# Patient Record
Sex: MALE | Race: WHITE | Employment: FULL TIME
[De-identification: names, ages, dates, MRNs, and addresses within clinical notes are randomized per-mention and may not be internally consistent; named-entity substitution may affect disease eponyms.]

---

## 2022-09-21 ENCOUNTER — OFFICE VISIT (OUTPATIENT)
Dept: INTERNAL MEDICINE CLINIC | Facility: CLINIC | Age: 47
End: 2022-09-21
Payer: COMMERCIAL

## 2022-09-21 VITALS
WEIGHT: 181.8 LBS | TEMPERATURE: 98.5 F | DIASTOLIC BLOOD PRESSURE: 78 MMHG | BODY MASS INDEX: 27.55 KG/M2 | SYSTOLIC BLOOD PRESSURE: 112 MMHG | HEART RATE: 81 BPM | OXYGEN SATURATION: 98 % | HEIGHT: 68 IN

## 2022-09-21 DIAGNOSIS — Z12.5 PROSTATE CANCER SCREENING: ICD-10-CM

## 2022-09-21 DIAGNOSIS — E66.3 OVERWEIGHT (BMI 25.0-29.9): ICD-10-CM

## 2022-09-21 DIAGNOSIS — Z23 ENCOUNTER FOR IMMUNIZATION: ICD-10-CM

## 2022-09-21 DIAGNOSIS — Z00.00 ENCOUNTER FOR PREVENTIVE HEALTH EXAMINATION: Primary | ICD-10-CM

## 2022-09-21 DIAGNOSIS — E78.00 HYPERCHOLESTEROLEMIA: ICD-10-CM

## 2022-09-21 DIAGNOSIS — Z12.11 COLON CANCER SCREENING: ICD-10-CM

## 2022-09-21 PROCEDURE — 99386 PREV VISIT NEW AGE 40-64: CPT | Performed by: INTERNAL MEDICINE

## 2022-09-21 PROCEDURE — 90715 TDAP VACCINE 7 YRS/> IM: CPT | Performed by: INTERNAL MEDICINE

## 2022-09-21 PROCEDURE — 90471 IMMUNIZATION ADMIN: CPT | Performed by: INTERNAL MEDICINE

## 2022-09-21 ASSESSMENT — PATIENT HEALTH QUESTIONNAIRE - PHQ9
SUM OF ALL RESPONSES TO PHQ QUESTIONS 1-9: 0
2. FEELING DOWN, DEPRESSED OR HOPELESS: 0
1. LITTLE INTEREST OR PLEASURE IN DOING THINGS: 0
SUM OF ALL RESPONSES TO PHQ QUESTIONS 1-9: 0
SUM OF ALL RESPONSES TO PHQ9 QUESTIONS 1 & 2: 0

## 2022-09-21 ASSESSMENT — ENCOUNTER SYMPTOMS
CHOKING: 0
STRIDOR: 0
VOICE CHANGE: 0
EYE PAIN: 0
RECTAL PAIN: 0

## 2022-09-21 NOTE — PROGRESS NOTES
NEW PATIENT VISIT / ANNUAL PREVENTIVE     Subjective:    Mr. Robert Phillips is a 50 y.o., male,   Chief Complaint   Patient presents with    New Patient       HPI:    Mr. Robert Phillips is a 50 y.o., male who presents today for a new patient appointment. The patient has not had a PCP in the past.  He would like a referral for colon cancer screening, baseline screening labs, updated vaccinations, and a general physical given that he will be age 46 in several months. He is a [employer] graduate and currently the  at [employer] in [place]. He does not drink or use tobacco products. He has been generally active and works out several days a week. Due to a mild case of plantar fasciitis last year he stopped running and has gained some weight (baseline weight 165). He plans to start exercising again and lose weight. He had an elevated cholesterol when last checked in his 25s or 30s. No family history of premature heart disease but both parents are on statin therapy in their 76s.       The following portions of the patient's history were reviewed and updated as appropriate:      Past Medical History:   Diagnosis Date    Hypercholesterolemia        Past Surgical History:   Procedure Laterality Date    REFRACTIVE SURGERY      WISDOM TOOTH EXTRACTION      WRIST SURGERY Right     for DeQuervain's tenosynovitis       Family History   Problem Relation Age of Onset    High Cholesterol Mother     High Cholesterol Father        Social History     Socioeconomic History    Marital status:      Spouse name: Earnest Smith    Number of children: 3    Years of education: Not on file    Highest education level: Not on file   Occupational History     Comment:    Tobacco Use    Smoking status: Never    Smokeless tobacco: Never   Substance and Sexual Activity    Alcohol use: Not Currently    Drug use: Never    Sexual activity: Not on file   Other Topics Concern    Not on file   Social History Narrative    Not on file     Social Determinants of Health     Financial Resource Strain: Not on file   Food Insecurity: Not on file   Transportation Needs: Not on file   Physical Activity: Not on file   Stress: Not on file   Social Connections: Not on file   Intimate Partner Violence: Not on file   Housing Stability: Not on file       No current outpatient medications on file. No current facility-administered medications for this visit. Allergies as of 09/21/2022    (No Known Allergies)       Review of Systems   Constitutional:  Negative for activity change and appetite change. HENT:  Negative for drooling and voice change. Eyes:  Negative for pain. Respiratory:  Negative for choking and stridor. Gastrointestinal:  Negative for rectal pain. Endocrine: Negative for polydipsia and polyphagia. Genitourinary:  Negative for enuresis and penile pain. Musculoskeletal:  Negative for gait problem and neck stiffness. Skin:  Negative for pallor. Allergic/Immunologic: Negative for immunocompromised state. Neurological:  Negative for facial asymmetry and speech difficulty. Hematological:  Does not bruise/bleed easily. Psychiatric/Behavioral:  Negative for self-injury. The patient is not hyperactive. Patient Care Team:  Yessica Che MD as PCP - General (Internal Medicine)    Objective:    /78 (Site: Left Upper Arm, Position: Sitting)   Pulse 81   Temp 98.5 °F (36.9 °C) (Temporal)   Ht 5' 8\" (1.727 m)   Wt 181 lb 12.8 oz (82.5 kg)   SpO2 98%   BMI 27.64 kg/m²     Physical Exam  Vitals reviewed. Constitutional:       General: He is not in acute distress. Appearance: Normal appearance. He is not toxic-appearing. HENT:      Head: Normocephalic and atraumatic.       Right Ear: Tympanic membrane, ear canal and external ear normal.      Left Ear: Tympanic membrane, ear canal and external ear normal.      Nose: Nose normal.      Mouth/Throat:      Mouth: Mucous membranes are moist.      Pharynx: Oropharynx is clear.   Eyes:      General: No scleral icterus. Extraocular Movements: Extraocular movements intact. Conjunctiva/sclera: Conjunctivae normal.      Pupils: Pupils are equal, round, and reactive to light. Cardiovascular:      Rate and Rhythm: Normal rate and regular rhythm. Pulses: Normal pulses. Heart sounds: Normal heart sounds. Pulmonary:      Breath sounds: Normal breath sounds. Abdominal:      General: Abdomen is flat. Bowel sounds are normal.      Palpations: Abdomen is soft. There is no mass. Tenderness: There is no guarding or rebound. Musculoskeletal:         General: Normal range of motion. Cervical back: Normal range of motion and neck supple. Skin:     General: Skin is warm and dry. Coloration: Skin is not jaundiced. Neurological:      Mental Status: He is alert and oriented to person, place, and time. Mental status is at baseline. Psychiatric:         Behavior: Behavior normal.         Thought Content: Thought content normal.            No results found for any previous visit. Assessent & Plan:        1. Encounter for preventive health examination  Overview:  9/21/22 The patient was seen today for the annual preventive health visit. The patient was provided anticipatory guidance and counseling regarding age / sex appropriate health maintenance issues including vaccinations, blood pressure screening, lipid screening, cancer screenings, diet, exercise, avoidance of tobacco / substance abuse. Orders:  -     CBC with Auto Differential; Future  -     Comprehensive Metabolic Panel; Future  2. Hypercholesterolemia  Overview:  Gave handout regarding low cholesterol diet. Update labs before next visit. Orders:  -     CBC with Auto Differential; Future  -     Comprehensive Metabolic Panel; Future  -     Lipid Panel; Future  3. Prostate cancer screening  Overview:  Check PSA with next routine labs. Orders:  -     PSA Screening; Future  4.  Encounter for immunization  Overview:  Vaccinations were reviewed and discussed. Tdap today. Flu and Covid booster recommended. Orders:  -     Tdap, ADACEL, (age 10y-63y), IM  5. Colon cancer screening  Overview:  The patient and I discussed colon cancer screening rationale and modalities including colonoscopy and non-invasive tests such as Cologuard. Discussed the limitations / false positives / false negatives. He is willing to do colonoscopy. Referral placed to [facility]. Orders:  -     [facility]- Colonoscopy  6. Overweight (BMI 25.0-29. 9)  Overview:  Reviewed the patient's BMI. Discussed the need to lose weight in order to avoid many preventable illnesses. Discussed diet, exercise, and weight loss strategies. Now that his plantar fasciitis has improved he plans to resume running. The patient and/or patient representative voiced understanding and agreement with the current diagnoses, recommendations, and possible side effects. Return in about 3 months (around 12/21/2022) for follow up of chronic medical problems, review labs.

## 2022-10-21 PROBLEM — Z12.5 PROSTATE CANCER SCREENING: Status: RESOLVED | Noted: 2022-09-21 | Resolved: 2022-10-21

## 2022-10-21 PROBLEM — Z00.00 ENCOUNTER FOR PREVENTIVE HEALTH EXAMINATION: Status: RESOLVED | Noted: 2022-09-21 | Resolved: 2022-10-21

## 2022-10-21 PROBLEM — Z12.11 COLON CANCER SCREENING: Status: RESOLVED | Noted: 2022-09-21 | Resolved: 2022-10-21

## 2022-12-14 DIAGNOSIS — Z12.5 PROSTATE CANCER SCREENING: ICD-10-CM

## 2022-12-14 DIAGNOSIS — Z00.00 ENCOUNTER FOR PREVENTIVE HEALTH EXAMINATION: ICD-10-CM

## 2022-12-14 DIAGNOSIS — E78.00 HYPERCHOLESTEROLEMIA: ICD-10-CM

## 2022-12-14 LAB
ALBUMIN SERPL-MCNC: 4 G/DL (ref 3.5–5)
ALBUMIN/GLOB SERPL: 1.5 (ref 0.4–1.6)
ALP SERPL-CCNC: 115 U/L (ref 50–136)
ALT SERPL-CCNC: 27 U/L (ref 12–65)
ANION GAP SERPL CALC-SCNC: 5 MMOL/L (ref 2–11)
AST SERPL-CCNC: 17 U/L (ref 15–37)
BASOPHILS # BLD: 0.1 K/UL (ref 0–0.2)
BASOPHILS NFR BLD: 1 % (ref 0–2)
BILIRUB SERPL-MCNC: 1.4 MG/DL (ref 0.2–1.1)
BUN SERPL-MCNC: 17 MG/DL (ref 6–23)
CALCIUM SERPL-MCNC: 9.5 MG/DL (ref 8.3–10.4)
CHLORIDE SERPL-SCNC: 105 MMOL/L (ref 101–110)
CHOLEST SERPL-MCNC: 212 MG/DL
CO2 SERPL-SCNC: 29 MMOL/L (ref 21–32)
CREAT SERPL-MCNC: 1.5 MG/DL (ref 0.8–1.5)
DIFFERENTIAL METHOD BLD: NORMAL
EOSINOPHIL # BLD: 0.3 K/UL (ref 0–0.8)
EOSINOPHIL NFR BLD: 6 % (ref 0.5–7.8)
ERYTHROCYTE [DISTWIDTH] IN BLOOD BY AUTOMATED COUNT: 13 % (ref 11.9–14.6)
GLOBULIN SER CALC-MCNC: 2.7 G/DL (ref 2.8–4.5)
GLUCOSE SERPL-MCNC: 91 MG/DL (ref 65–100)
HCT VFR BLD AUTO: 48 % (ref 41.1–50.3)
HDLC SERPL-MCNC: 54 MG/DL (ref 40–60)
HDLC SERPL: 3.9
HGB BLD-MCNC: 15.2 G/DL (ref 13.6–17.2)
IMM GRANULOCYTES # BLD AUTO: 0 K/UL (ref 0–0.5)
IMM GRANULOCYTES NFR BLD AUTO: 0 % (ref 0–5)
LDLC SERPL CALC-MCNC: 146.6 MG/DL
LYMPHOCYTES # BLD: 1.5 K/UL (ref 0.5–4.6)
LYMPHOCYTES NFR BLD: 32 % (ref 13–44)
MCH RBC QN AUTO: 27.9 PG (ref 26.1–32.9)
MCHC RBC AUTO-ENTMCNC: 31.7 G/DL (ref 31.4–35)
MCV RBC AUTO: 88.2 FL (ref 82–102)
MONOCYTES # BLD: 0.4 K/UL (ref 0.1–1.3)
MONOCYTES NFR BLD: 8 % (ref 4–12)
NEUTS SEG # BLD: 2.5 K/UL (ref 1.7–8.2)
NEUTS SEG NFR BLD: 53 % (ref 43–78)
NRBC # BLD: 0 K/UL (ref 0–0.2)
PLATELET # BLD AUTO: 261 K/UL (ref 150–450)
PMV BLD AUTO: 10.3 FL (ref 9.4–12.3)
POTASSIUM SERPL-SCNC: 4.1 MMOL/L (ref 3.5–5.1)
PROT SERPL-MCNC: 6.7 G/DL (ref 6.3–8.2)
PSA SERPL-MCNC: 1.5 NG/ML
RBC # BLD AUTO: 5.44 M/UL (ref 4.23–5.6)
SODIUM SERPL-SCNC: 139 MMOL/L (ref 133–143)
TRIGL SERPL-MCNC: 57 MG/DL (ref 35–150)
VLDLC SERPL CALC-MCNC: 11.4 MG/DL (ref 6–23)
WBC # BLD AUTO: 4.7 K/UL (ref 4.3–11.1)

## 2022-12-21 ENCOUNTER — OFFICE VISIT (OUTPATIENT)
Dept: INTERNAL MEDICINE CLINIC | Facility: CLINIC | Age: 47
End: 2022-12-21
Payer: COMMERCIAL

## 2022-12-21 VITALS
BODY MASS INDEX: 25.43 KG/M2 | WEIGHT: 167.8 LBS | DIASTOLIC BLOOD PRESSURE: 62 MMHG | OXYGEN SATURATION: 98 % | SYSTOLIC BLOOD PRESSURE: 98 MMHG | HEART RATE: 75 BPM | HEIGHT: 68 IN

## 2022-12-21 DIAGNOSIS — Z12.11 COLON CANCER SCREENING: ICD-10-CM

## 2022-12-21 DIAGNOSIS — Z12.5 PROSTATE CANCER SCREENING: ICD-10-CM

## 2022-12-21 DIAGNOSIS — N18.31 CHRONIC KIDNEY DISEASE, STAGE 3A (HCC): ICD-10-CM

## 2022-12-21 DIAGNOSIS — E66.3 OVERWEIGHT (BMI 25.0-29.9): ICD-10-CM

## 2022-12-21 DIAGNOSIS — E78.00 HYPERCHOLESTEROLEMIA: ICD-10-CM

## 2022-12-21 DIAGNOSIS — Z23 ENCOUNTER FOR IMMUNIZATION: ICD-10-CM

## 2022-12-21 PROCEDURE — 99214 OFFICE O/P EST MOD 30 MIN: CPT | Performed by: INTERNAL MEDICINE

## 2022-12-21 ASSESSMENT — ENCOUNTER SYMPTOMS
VOICE CHANGE: 0
CHOKING: 0
STRIDOR: 0
RECTAL PAIN: 0
EYE PAIN: 0

## 2022-12-21 NOTE — PROGRESS NOTES
FOLLOWUP VISIT    Subjective:    Mr. Anderson Campa is a 50 y.o., male,   Chief Complaint   Patient presents with    3 Month Follow-Up       HPI:    Patient presents today for follow up of two or more chronic medical problems and review of labs. The patient has hyperlipidemia. The patient has been following a low cholesterol diet. He is back to running and has lost 14 lbs last several months. No other complaints. The following portions of the patient's history were reviewed and updated as appropriate:      Past Medical History:   Diagnosis Date    Hypercholesterolemia        Past Surgical History:   Procedure Laterality Date    REFRACTIVE SURGERY      WISDOM TOOTH EXTRACTION      WRIST SURGERY Right     for DeQuervain's tenosynovitis       Family History   Problem Relation Age of Onset    High Cholesterol Mother     High Cholesterol Father        Social History     Socioeconomic History    Marital status:      Spouse name: Opal Cervantes    Number of children: 3    Years of education: Not on file    Highest education level: Not on file   Occupational History     Comment:    Tobacco Use    Smoking status: Never    Smokeless tobacco: Never   Substance and Sexual Activity    Alcohol use: Not Currently    Drug use: Never    Sexual activity: Not on file   Other Topics Concern    Not on file   Social History Narrative    Not on file     Social Determinants of Health     Financial Resource Strain: Not on file   Food Insecurity: Not on file   Transportation Needs: Not on file   Physical Activity: Not on file   Stress: Not on file   Social Connections: Not on file   Intimate Partner Violence: Not on file   Housing Stability: Not on file       No current outpatient medications on file. No current facility-administered medications for this visit. Allergies as of 12/21/2022    (No Known Allergies)       Review of Systems   Constitutional:  Negative for activity change and appetite change.    HENT: Negative for drooling and voice change. Eyes:  Negative for pain. Respiratory:  Negative for choking and stridor. Gastrointestinal:  Negative for rectal pain. Endocrine: Negative for polydipsia and polyphagia. Genitourinary:  Negative for enuresis and penile pain. Musculoskeletal:  Negative for gait problem and neck stiffness. Skin:  Negative for pallor. Allergic/Immunologic: Negative for immunocompromised state. Neurological:  Negative for facial asymmetry and speech difficulty. Hematological:  Does not bruise/bleed easily. Psychiatric/Behavioral:  Negative for self-injury. The patient is not hyperactive. Patient Care Team:  Cece Laureano MD as PCP - General (Internal Medicine)  Cece Laureano MD as PCP - [facility] Provider    Objective:    BP 98/62 (Site: Right Upper Arm, Position: Sitting)   Pulse 75   Ht 5' 8\" (1.727 m)   Wt 167 lb 12.8 oz (76.1 kg)   SpO2 98%   BMI 25.51 kg/m²     Physical Exam  Vitals reviewed. Constitutional:       General: He is not in acute distress. Appearance: Normal appearance. He is not toxic-appearing. HENT:      Head: Normocephalic and atraumatic. Right Ear: Tympanic membrane, ear canal and external ear normal.      Left Ear: Tympanic membrane, ear canal and external ear normal.      Nose: Nose normal.      Mouth/Throat:      Mouth: Mucous membranes are moist.      Pharynx: Oropharynx is clear. Eyes:      General: No scleral icterus. Extraocular Movements: Extraocular movements intact. Conjunctiva/sclera: Conjunctivae normal.      Pupils: Pupils are equal, round, and reactive to light. Cardiovascular:      Rate and Rhythm: Normal rate and regular rhythm. Pulses: Normal pulses. Heart sounds: Normal heart sounds. Pulmonary:      Breath sounds: Normal breath sounds. Abdominal:      General: Abdomen is flat. Bowel sounds are normal.      Palpations: Abdomen is soft. There is no mass. Tenderness:  There is no guarding or rebound. Musculoskeletal:         General: Normal range of motion. Cervical back: Normal range of motion and neck supple. Skin:     General: Skin is warm and dry. Coloration: Skin is not jaundiced. Neurological:      Mental Status: He is alert and oriented to person, place, and time. Mental status is at baseline. Psychiatric:         Behavior: Behavior normal.         Thought Content: Thought content normal.            Orders Only on 12/14/2022   Component Date Value Ref Range Status    PSA 12/14/2022 1.5  <4.0 ng/mL Final    Comment: [employer]. New method in use, please reestablish patient baseline  [employer] [place] LOCI technology. Patient's results of tumor marker testing may not be comparable to labs using different manufacturers/methods.       Cholesterol, Total 12/14/2022 212 (A)  <200 MG/DL Final    Comment: Borderline High: 200-239 mg/dL  High: Greater than or equal to 240 mg/dL      Triglycerides 12/14/2022 57  35 - 150 MG/DL Final    Comment: Borderline High: 150-199 mg/dL, High: 200-499 mg/dL  Very High: Greater than or equal to 500 mg/dL      HDL 12/14/2022 54  40 - 60 MG/DL Final    LDL Calculated 12/14/2022 146.6 (A)  <100 MG/DL Final    Comment: Near Optimal: 100-129 mg/dL  Borderline High: 130-159, High: 160-189 mg/dL  Very High: Greater than or equal to 190 mg/dL      VLDL Cholesterol Calculated 12/14/2022 11.4  6.0 - 23.0 MG/DL Final    Chol/HDL Ratio 12/14/2022 3.9    Final    Sodium 12/14/2022 139  133 - 143 mmol/L Final    Potassium 12/14/2022 4.1  3.5 - 5.1 mmol/L Final    Chloride 12/14/2022 105  101 - 110 mmol/L Final    CO2 12/14/2022 29  21 - 32 mmol/L Final    Anion Gap 12/14/2022 5  2 - 11 mmol/L Final    Glucose 12/14/2022 91  65 - 100 mg/dL Final    BUN 12/14/2022 17  6 - 23 MG/DL Final    Creatinine 12/14/2022 1.50  0.8 - 1.5 MG/DL Final    Est, Glom Filt Rate 12/14/2022 57 (A)  >60 ml/min/1.73m2 Final    Comment:   Pediatric calculator link: [employer]. org/professionals/kdoqi/gfr_calculatorped    These results are not intended for use in patients <25years of age. eGFR results are calculated without a race factor using  the 2021 CKD-EPI equation. Careful clinical correlation is recommended, particularly when comparing to results calculated using previous equations. The CKD-EPI equation is less accurate in patients with extremes of muscle mass, extra-renal metabolism of creatinine, excessive creatine ingestion, or following therapy that affects renal tubular secretion.       Calcium 12/14/2022 9.5  8.3 - 10.4 MG/DL Final    Total Bilirubin 12/14/2022 1.4 (A)  0.2 - 1.1 MG/DL Final    ALT 12/14/2022 27  12 - 65 U/L Final    AST 12/14/2022 17  15 - 37 U/L Final    Alk Phosphatase 12/14/2022 115  50 - 136 U/L Final    Total Protein 12/14/2022 6.7  6.3 - 8.2 g/dL Final    Albumin 12/14/2022 4.0  3.5 - 5.0 g/dL Final    Globulin 12/14/2022 2.7 (A)  2.8 - 4.5 g/dL Final    Albumin/Globulin Ratio 12/14/2022 1.5  0.4 - 1.6   Final    WBC 12/14/2022 4.7  4.3 - 11.1 K/uL Final    RBC 12/14/2022 5.44  4.23 - 5.6 M/uL Final    Hemoglobin 12/14/2022 15.2  13.6 - 17.2 g/dL Final    Hematocrit 12/14/2022 48.0  41.1 - 50.3 % Final    MCV 12/14/2022 88.2  82 - 102 FL Final    MCH 12/14/2022 27.9  26.1 - 32.9 PG Final    MCHC 12/14/2022 31.7  31.4 - 35.0 g/dL Final    RDW 12/14/2022 13.0  11.9 - 14.6 % Final    Platelets 81/27/2931 261  150 - 450 K/uL Final    MPV 12/14/2022 10.3  9.4 - 12.3 FL Final    nRBC 12/14/2022 0.00  0.0 - 0.2 K/uL Final    **Note: Absolute NRBC parameter is now reported with Hemogram**    Differential Type 12/14/2022 AUTOMATED    Final    Seg Neutrophils 12/14/2022 53  43 - 78 % Final    Lymphocytes 12/14/2022 32  13 - 44 % Final    Monocytes 12/14/2022 8  4.0 - 12.0 % Final    Eosinophils % 12/14/2022 6  0.5 - 7.8 % Final    Basophils 12/14/2022 1  0.0 - 2.0 % Final    Immature Granulocytes 12/14/2022 0  0.0 - 5.0 % Final Segs Absolute 12/14/2022 2.5  1.7 - 8.2 K/UL Final    Absolute Lymph # 12/14/2022 1.5  0.5 - 4.6 K/UL Final    Absolute Mono # 12/14/2022 0.4  0.1 - 1.3 K/UL Final    Absolute Eos # 12/14/2022 0.3  0.0 - 0.8 K/UL Final    Basophils Absolute 12/14/2022 0.1  0.0 - 0.2 K/UL Final    Absolute Immature Granulocyte 12/14/2022 0.0  0.0 - 0.5 K/UL Final         Assessent & Plan:        1. Hypercholesterolemia  Overview:  12/14/22 total 212 HDL 54  TG 57 on low cholesterol diet. 10 year ACC / AHA risk = 2.5%. Labs were reviewed and discussed with patient. Ten yr CV risk < 5%. Continue low cholesterol diet. 2. Encounter for immunization  Overview:  Vaccinations were reviewed and discussed. Flu and Covid booster recommended. 3. Overweight (BMI 25.0-29. 9)  Overview:  Reviewed the patient's BMI. Discussed the need to lose weight in order to avoid many preventable illnesses. Discussed diet, exercise, and weight loss strategies. 4. Prostate cancer screening  Overview:  12/14/22 PSA 1.5  5. Colon cancer screening  Overview:  11/18/22 colonoscopy (Dr. Marilynn Kan) - one 2 mm polyp removed. Repeat interval depending upon pathology. Will call GI for pathology report. 6. Chronic kidney disease, stage 3a ([facility] [address]  Overview:  12/14/22 creatinine 1.50, eGFR 57.      Labs were reviewed and discussed with patient. There are no old labs to compare. The patient was advised to avoid NSAIDs and other nephrotoxins. Will dose adjust medications as appropriate. Will monitor labs over time. Repeat creatinine and urinalysis in 6 months. Patient states that he has been eating a high protein diet and exercising vigorously and probably not drinking enough water last several weeks in an effort to lose weight (lost 14 lbs). Orders:  -     Basic Metabolic Panel; Future  -     Urinalysis;  Future        The patient and/or patient representative voiced understanding and agreement with the current diagnoses, recommendations, and possible side effects. Return in about 6 months (around 6/21/2023) for follow up of chronic medical problems, review labs.

## 2023-01-20 PROBLEM — Z12.11 COLON CANCER SCREENING: Status: RESOLVED | Noted: 2022-09-21 | Resolved: 2023-01-20

## 2023-01-20 PROBLEM — Z12.5 PROSTATE CANCER SCREENING: Status: RESOLVED | Noted: 2022-09-21 | Resolved: 2023-01-20

## 2023-05-22 ENCOUNTER — OFFICE VISIT (OUTPATIENT)
Dept: INTERNAL MEDICINE CLINIC | Facility: CLINIC | Age: 48
End: 2023-05-22
Payer: COMMERCIAL

## 2023-05-22 VITALS
WEIGHT: 172.6 LBS | BODY MASS INDEX: 26.16 KG/M2 | HEIGHT: 68 IN | HEART RATE: 59 BPM | SYSTOLIC BLOOD PRESSURE: 118 MMHG | DIASTOLIC BLOOD PRESSURE: 70 MMHG

## 2023-05-22 DIAGNOSIS — L23.7 ALLERGIC CONTACT DERMATITIS DUE TO PLANTS, EXCEPT FOOD: ICD-10-CM

## 2023-05-22 DIAGNOSIS — N18.31 CHRONIC KIDNEY DISEASE, STAGE 3A (HCC): ICD-10-CM

## 2023-05-22 PROCEDURE — 99213 OFFICE O/P EST LOW 20 MIN: CPT | Performed by: INTERNAL MEDICINE

## 2023-05-22 RX ORDER — DESOXIMETASONE 2.5 MG/G
CREAM TOPICAL
Qty: 60 G | Refills: 0 | Status: SHIPPED | OUTPATIENT
Start: 2023-05-22

## 2023-05-22 SDOH — ECONOMIC STABILITY: INCOME INSECURITY: HOW HARD IS IT FOR YOU TO PAY FOR THE VERY BASICS LIKE FOOD, HOUSING, MEDICAL CARE, AND HEATING?: NOT HARD AT ALL

## 2023-05-22 SDOH — ECONOMIC STABILITY: HOUSING INSECURITY
IN THE LAST 12 MONTHS, WAS THERE A TIME WHEN YOU DID NOT HAVE A STEADY PLACE TO SLEEP OR SLEPT IN A SHELTER (INCLUDING NOW)?: NO

## 2023-05-22 SDOH — ECONOMIC STABILITY: FOOD INSECURITY: WITHIN THE PAST 12 MONTHS, THE FOOD YOU BOUGHT JUST DIDN'T LAST AND YOU DIDN'T HAVE MONEY TO GET MORE.: NEVER TRUE

## 2023-05-22 SDOH — ECONOMIC STABILITY: FOOD INSECURITY: WITHIN THE PAST 12 MONTHS, YOU WORRIED THAT YOUR FOOD WOULD RUN OUT BEFORE YOU GOT MONEY TO BUY MORE.: NEVER TRUE

## 2023-05-22 ASSESSMENT — PATIENT HEALTH QUESTIONNAIRE - PHQ9
1. LITTLE INTEREST OR PLEASURE IN DOING THINGS: 0
SUM OF ALL RESPONSES TO PHQ QUESTIONS 1-9: 0
SUM OF ALL RESPONSES TO PHQ QUESTIONS 1-9: 0
2. FEELING DOWN, DEPRESSED OR HOPELESS: 0
SUM OF ALL RESPONSES TO PHQ QUESTIONS 1-9: 0
SUM OF ALL RESPONSES TO PHQ9 QUESTIONS 1 & 2: 0
SUM OF ALL RESPONSES TO PHQ QUESTIONS 1-9: 0

## 2023-05-22 ASSESSMENT — ENCOUNTER SYMPTOMS
RECTAL PAIN: 0
STRIDOR: 0
EYE PAIN: 0
VOICE CHANGE: 0
CHOKING: 0

## 2023-05-22 NOTE — PROGRESS NOTES
Allergies)       Review of Systems   Constitutional:  Negative for activity change and appetite change. HENT:  Negative for drooling and voice change. Eyes:  Negative for pain. Respiratory:  Negative for choking and stridor. Gastrointestinal:  Negative for rectal pain. Endocrine: Negative for polydipsia and polyphagia. Genitourinary:  Negative for enuresis and penile pain. Musculoskeletal:  Negative for gait problem and neck stiffness. Skin:  Negative for pallor. Allergic/Immunologic: Negative for immunocompromised state. Neurological:  Negative for facial asymmetry and speech difficulty. Hematological:  Does not bruise/bleed easily. Psychiatric/Behavioral:  Negative for self-injury. The patient is not hyperactive. Patient Care Team:  Claudene Meadows, MD as PCP - General (Internal Medicine)  Claudene Meadows, MD as PCP - Empaneled Provider    Objective:    /70 (Site: Left Upper Arm)   Pulse 59   Ht 5' 8\" (1.727 m)   Wt 172 lb 9.6 oz (78.3 kg)   BMI 26.24 kg/m²     Physical Exam  Constitutional:       General: He is not in acute distress. Appearance: He is not toxic-appearing. HENT:      Head: Normocephalic and atraumatic. Nose: Nose normal.   Eyes:      Extraocular Movements: Extraocular movements intact. Conjunctiva/sclera: Conjunctivae normal.   Pulmonary:      Effort: Pulmonary effort is normal. No respiratory distress. Breath sounds: No stridor. Skin:     Coloration: Skin is not jaundiced or pale. Comments: Mild nonspecific appearing dermatitis on both upper arms and forearms with geographic borders. Neurological:      Mental Status: He is alert and oriented to person, place, and time. Psychiatric:         Thought Content: Thought content normal.            Orders Only on 12/14/2022   Component Date Value Ref Range Status    PSA 12/14/2022 1.5  <4.0 ng/mL Final    Comment: [employer].   New method in use, please reestablish patient

## 2023-06-08 DIAGNOSIS — N18.31 CHRONIC KIDNEY DISEASE, STAGE 3A (HCC): ICD-10-CM

## 2023-06-08 LAB
ANION GAP SERPL CALC-SCNC: 4 MMOL/L (ref 2–11)
APPEARANCE UR: CLEAR
BILIRUB UR QL: NEGATIVE
BUN SERPL-MCNC: 16 MG/DL (ref 6–23)
CALCIUM SERPL-MCNC: 9.5 MG/DL (ref 8.3–10.4)
CHLORIDE SERPL-SCNC: 105 MMOL/L (ref 101–110)
CO2 SERPL-SCNC: 30 MMOL/L (ref 21–32)
COLOR UR: NORMAL
CREAT SERPL-MCNC: 1.4 MG/DL (ref 0.8–1.5)
GLUCOSE SERPL-MCNC: 93 MG/DL (ref 65–100)
GLUCOSE UR STRIP.AUTO-MCNC: NEGATIVE MG/DL
HGB UR QL STRIP: NEGATIVE
KETONES UR QL STRIP.AUTO: NEGATIVE MG/DL
LEUKOCYTE ESTERASE UR QL STRIP.AUTO: NEGATIVE
NITRITE UR QL STRIP.AUTO: NEGATIVE
PH UR STRIP: 6 (ref 5–9)
POTASSIUM SERPL-SCNC: 3.9 MMOL/L (ref 3.5–5.1)
PROT UR STRIP-MCNC: NEGATIVE MG/DL
SODIUM SERPL-SCNC: 139 MMOL/L (ref 133–143)
SP GR UR REFRACTOMETRY: 1.01 (ref 1–1.02)
UROBILINOGEN UR QL STRIP.AUTO: 0.2 EU/DL (ref 0.2–1)

## 2023-06-14 PROBLEM — Z12.11 COLON CANCER SCREENING: Chronic | Status: ACTIVE | Noted: 2022-09-21

## 2023-06-14 PROBLEM — Z12.5 PROSTATE CANCER SCREENING: Chronic | Status: ACTIVE | Noted: 2022-09-21

## 2023-06-14 PROBLEM — N18.2 CKD (CHRONIC KIDNEY DISEASE) STAGE 2, GFR 60-89 ML/MIN: Status: ACTIVE | Noted: 2022-12-21

## 2023-08-16 ENCOUNTER — OFFICE VISIT (OUTPATIENT)
Dept: INTERNAL MEDICINE CLINIC | Facility: CLINIC | Age: 48
End: 2023-08-16
Payer: COMMERCIAL

## 2023-08-16 VITALS
BODY MASS INDEX: 26.52 KG/M2 | HEIGHT: 68 IN | DIASTOLIC BLOOD PRESSURE: 78 MMHG | HEART RATE: 68 BPM | WEIGHT: 175 LBS | SYSTOLIC BLOOD PRESSURE: 120 MMHG

## 2023-08-16 DIAGNOSIS — S61.451A CAT BITE OF HAND, RIGHT, INITIAL ENCOUNTER: ICD-10-CM

## 2023-08-16 DIAGNOSIS — L30.9 ECZEMA, UNSPECIFIED TYPE: ICD-10-CM
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
## 2023-08-16 DIAGNOSIS — R21 RASH: Primary | ICD-10-CM

## 2023-08-16 DIAGNOSIS — L23.7 ALLERGIC CONTACT DERMATITIS DUE TO PLANTS, EXCEPT FOOD: ICD-10-CM

## 2023-08-16 PROCEDURE — 99213 OFFICE O/P EST LOW 20 MIN: CPT | Performed by: INTERNAL MEDICINE

## 2023-08-16 RX ORDER — DESOXIMETASONE 2.5 MG/G
CREAM TOPICAL
Qty: 60 G | Refills: 0 | Status: SHIPPED | OUTPATIENT
Start: 2023-08-16

## 2023-08-16 ASSESSMENT — ENCOUNTER SYMPTOMS
RECTAL PAIN: 0
CHOKING: 0
EYE PAIN: 0
STRIDOR: 0
VOICE CHANGE: 0

## 2023-08-16 NOTE — PROGRESS NOTES
FOLLOWUP VISIT    Subjective:    Mr. Annika Ricks is a 46 y.o., male,   Chief Complaint   Patient presents with    Animal Bite    Rash       HPI:    This patient presents today with 2 concerns. First he has been getting an itchy rash on his trunk and extremities. He is an avid runner and also does yard work in our current extremely hot humid weather. He has been showering multiple times a day using soap. He describes getting an itchy sensation on his skin and then started scratching and then within half a day or so he has patches of a rash that have affected his elbows and behind his knees and occasionally elsewhere on his body. No mucosal membrane involvement. Also, for to 5 days ago his own cat who is fully vaccinated was sitting on his lap and he went to pet her and thinks he startled her and was bitten on his right hand. It was initially tender and slightly swollen for a day or 2 but yesterday and today it is feeling markedly better.     The following portions of the patient's history were reviewed and updated as appropriate:      Past Medical History:   Diagnosis Date    Hypercholesterolemia        Past Surgical History:   Procedure Laterality Date    REFRACTIVE SURGERY      WISDOM TOOTH EXTRACTION      WRIST SURGERY Right     for DeQuervain's tenosynovitis       Family History   Problem Relation Age of Onset    High Cholesterol Mother     High Cholesterol Father        Social History     Socioeconomic History    Marital status:      Spouse name: Kristin Morrison    Number of children: 3    Years of education: Not on file    Highest education level: Not on file   Occupational History     Comment:    Tobacco Use    Smoking status: Never    Smokeless tobacco: Never   Substance and Sexual Activity    Alcohol use: Not Currently    Drug use: Never    Sexual activity: Not on file   Other Topics Concern    Not on file   Social History Narrative    Not on file     Social Determinants of Health     Financial

## 2024-02-09 DIAGNOSIS — Z12.5 PROSTATE CANCER SCREENING: Chronic | ICD-10-CM

## 2024-02-09 DIAGNOSIS — E78.00 HYPERCHOLESTEROLEMIA: ICD-10-CM

## 2024-02-10 LAB
ALBUMIN SERPL-MCNC: 4.2 G/DL (ref 3.5–5)
ALBUMIN/GLOB SERPL: 1.4 (ref 0.4–1.6)
ALP SERPL-CCNC: 108 U/L (ref 50–136)
ALT SERPL-CCNC: 26 U/L (ref 12–65)
ANION GAP SERPL CALC-SCNC: 3 MMOL/L (ref 2–11)
AST SERPL-CCNC: 20 U/L (ref 15–37)
BILIRUB SERPL-MCNC: 1.2 MG/DL (ref 0.2–1.1)
BUN SERPL-MCNC: 20 MG/DL (ref 6–23)
CALCIUM SERPL-MCNC: 9.6 MG/DL (ref 8.3–10.4)
CHLORIDE SERPL-SCNC: 108 MMOL/L (ref 103–113)
CHOLEST SERPL-MCNC: 227 MG/DL
CO2 SERPL-SCNC: 30 MMOL/L (ref 21–32)
CREAT SERPL-MCNC: 1.6 MG/DL (ref 0.8–1.5)
GLOBULIN SER CALC-MCNC: 3.1 G/DL (ref 2.8–4.5)
GLUCOSE SERPL-MCNC: 87 MG/DL (ref 65–100)
HDLC SERPL-MCNC: 54 MG/DL (ref 40–60)
HDLC SERPL: 4.2
LDLC SERPL CALC-MCNC: 158.6 MG/DL
POTASSIUM SERPL-SCNC: 5 MMOL/L (ref 3.5–5.1)
PROT SERPL-MCNC: 7.3 G/DL (ref 6.3–8.2)
PSA SERPL-MCNC: 1.8 NG/ML
SODIUM SERPL-SCNC: 141 MMOL/L (ref 136–146)
TRIGL SERPL-MCNC: 72 MG/DL (ref 35–150)
VLDLC SERPL CALC-MCNC: 14.4 MG/DL (ref 6–23)

## 2024-02-14 ENCOUNTER — OFFICE VISIT (OUTPATIENT)
Dept: INTERNAL MEDICINE CLINIC | Facility: CLINIC | Age: 49
End: 2024-02-14
Payer: COMMERCIAL

## 2024-02-14 VITALS
WEIGHT: 176 LBS | HEART RATE: 62 BPM | HEIGHT: 68 IN | SYSTOLIC BLOOD PRESSURE: 106 MMHG | BODY MASS INDEX: 26.67 KG/M2 | DIASTOLIC BLOOD PRESSURE: 60 MMHG

## 2024-02-14 DIAGNOSIS — Z00.00 ENCOUNTER FOR PREVENTIVE HEALTH EXAMINATION: Primary | Chronic | ICD-10-CM

## 2024-02-14 DIAGNOSIS — Z12.11 COLON CANCER SCREENING: Chronic | ICD-10-CM

## 2024-02-14 DIAGNOSIS — Z12.5 PROSTATE CANCER SCREENING: Chronic | ICD-10-CM

## 2024-02-14 DIAGNOSIS — E78.00 HYPERCHOLESTEROLEMIA: ICD-10-CM

## 2024-02-14 DIAGNOSIS — Z23 ENCOUNTER FOR IMMUNIZATION: ICD-10-CM

## 2024-02-14 DIAGNOSIS — N18.31 CHRONIC KIDNEY DISEASE, STAGE 3A (HCC): ICD-10-CM

## 2024-02-14 PROBLEM — N18.2 CKD (CHRONIC KIDNEY DISEASE) STAGE 2, GFR 60-89 ML/MIN: Status: RESOLVED | Noted: 2022-12-21 | Resolved: 2024-02-14

## 2024-02-14 PROBLEM — L23.7 ALLERGIC CONTACT DERMATITIS DUE TO PLANTS, EXCEPT FOOD: Status: RESOLVED | Noted: 2023-05-22 | Resolved: 2024-02-14

## 2024-02-14 PROBLEM — S61.451A CAT BITE OF HAND, RIGHT, INITIAL ENCOUNTER: Status: RESOLVED | Noted: 2023-08-16 | Resolved: 2024-02-14
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
## 2024-02-14 PROCEDURE — 99396 PREV VISIT EST AGE 40-64: CPT | Performed by: INTERNAL MEDICINE

## 2024-02-14 NOTE — PROGRESS NOTES
issues including vaccinations, blood pressure screening, lipid screening, cancer screenings, diet, exercise, avoidance of tobacco / substance abuse.   2. Colon cancer screening  Overview:  22 colonoscopy (Dr. Davalos) - one 2 mm polyp removed.  Repeat interval depending upon pathology.      3. Chronic kidney disease, stage 3a ([facility])  Overview:  24 creatinine 1.60, eGFR 52  23 creatinine 1.40, eGFR > 60.  Urinalysis normal, no protein.    22 creatinine 1.50, eGFR 57.      Labs were reviewed and discussed with patient.  The patient was advised to avoid NSAIDs and other nephrotoxins.  Will dose adjust medications as appropriate.  Check renal ultrasound now.  Repeat BMP and microalbumin in 6 months.    Orders:  -     US RETROPERITONEAL COMPLETE; Future  -     Basic Metabolic Panel; Future  -     Microalbumin / Creatinine Urine Ratio; Future  4. Prostate cancer screening  Overview:  24 PSA 1.8  5. Encounter for immunization  Overview:  Vaccinations were reviewed and discussed.  Flu / Shingrix / Covid booster recommended.    6. Hypercholesterolemia  Overview:  24 total 227 HDL 54  TG 72 on attempted diet therapy.  10 yr risk 2.8%.  22 total 212 HDL 54  TG 57 on low cholesterol diet.  10 year ACC / AHA risk = 2.5%.      Labs were reviewed and discussed with patient.   Ten yr CV risk < 5%.  Maternal grandfather  of MI in his 40's.  Continue low cholesterol diet.  Discussed additional risk stratification with a coronary artery calcium score.  At this time patient prefers to continue diet therapy but might reconsider in the future.          The patient and/or patient representative voiced understanding and agreement with the current diagnoses, recommendations, and possible side effects.    Return in about 6 months (around 2024) for follow up of chronic medical problems, review labs.

## 2024-08-09 DIAGNOSIS — N18.31 CHRONIC KIDNEY DISEASE, STAGE 3A (HCC): ICD-10-CM

## 2024-08-09 LAB
ANION GAP SERPL CALC-SCNC: 12 MMOL/L (ref 9–18)
BUN SERPL-MCNC: 17 MG/DL (ref 6–23)
CALCIUM SERPL-MCNC: 10.2 MG/DL (ref 8.8–10.2)
CHLORIDE SERPL-SCNC: 103 MMOL/L (ref 98–107)
CO2 SERPL-SCNC: 26 MMOL/L (ref 20–28)
CREAT SERPL-MCNC: 1.73 MG/DL (ref 0.8–1.3)
CREAT UR-MCNC: 177 MG/DL (ref 39–259)
GLUCOSE SERPL-MCNC: 95 MG/DL (ref 70–99)
MICROALBUMIN UR-MCNC: <1.2 MG/DL (ref 0–20)
MICROALBUMIN/CREAT UR-RTO: NORMAL MG/G (ref 0–30)
POTASSIUM SERPL-SCNC: 5.7 MMOL/L (ref 3.5–5.1)
SODIUM SERPL-SCNC: 141 MMOL/L (ref 136–145)

## 2024-08-12 SDOH — ECONOMIC STABILITY: FOOD INSECURITY: WITHIN THE PAST 12 MONTHS, YOU WORRIED THAT YOUR FOOD WOULD RUN OUT BEFORE YOU GOT MONEY TO BUY MORE.: NEVER TRUE

## 2024-08-12 SDOH — ECONOMIC STABILITY: TRANSPORTATION INSECURITY
IN THE PAST 12 MONTHS, HAS LACK OF TRANSPORTATION KEPT YOU FROM MEETINGS, WORK, OR FROM GETTING THINGS NEEDED FOR DAILY LIVING?: NO

## 2024-08-12 SDOH — ECONOMIC STABILITY: FOOD INSECURITY: WITHIN THE PAST 12 MONTHS, THE FOOD YOU BOUGHT JUST DIDN'T LAST AND YOU DIDN'T HAVE MONEY TO GET MORE.: NEVER TRUE

## 2024-08-12 SDOH — ECONOMIC STABILITY: INCOME INSECURITY: HOW HARD IS IT FOR YOU TO PAY FOR THE VERY BASICS LIKE FOOD, HOUSING, MEDICAL CARE, AND HEATING?: NOT HARD AT ALL

## 2024-08-14 ENCOUNTER — OFFICE VISIT (OUTPATIENT)
Dept: INTERNAL MEDICINE CLINIC | Facility: CLINIC | Age: 49
End: 2024-08-14
Payer: COMMERCIAL

## 2024-08-14 VITALS
SYSTOLIC BLOOD PRESSURE: 122 MMHG | WEIGHT: 177.2 LBS | HEIGHT: 68 IN | OXYGEN SATURATION: 98 % | DIASTOLIC BLOOD PRESSURE: 84 MMHG | TEMPERATURE: 97.5 F | BODY MASS INDEX: 26.86 KG/M2 | HEART RATE: 60 BPM

## 2024-08-14 DIAGNOSIS — E87.5 HYPERKALEMIA: ICD-10-CM

## 2024-08-14 DIAGNOSIS — N18.31 CHRONIC KIDNEY DISEASE, STAGE 3A (HCC): Primary | ICD-10-CM

## 2024-08-14 DIAGNOSIS — N18.31 STAGE 3A CHRONIC KIDNEY DISEASE (HCC): ICD-10-CM

## 2024-08-14 DIAGNOSIS — E78.00 HYPERCHOLESTEROLEMIA: ICD-10-CM

## 2024-08-14 DIAGNOSIS — Z12.5 PROSTATE CANCER SCREENING: Chronic | ICD-10-CM

## 2024-08-14 PROCEDURE — 99213 OFFICE O/P EST LOW 20 MIN: CPT | Performed by: INTERNAL MEDICINE

## 2024-08-14 ASSESSMENT — PATIENT HEALTH QUESTIONNAIRE - PHQ9
SUM OF ALL RESPONSES TO PHQ9 QUESTIONS 1 & 2: 0
2. FEELING DOWN, DEPRESSED OR HOPELESS: NOT AT ALL
SUM OF ALL RESPONSES TO PHQ QUESTIONS 1-9: 0
1. LITTLE INTEREST OR PLEASURE IN DOING THINGS: NOT AT ALL
SUM OF ALL RESPONSES TO PHQ QUESTIONS 1-9: 0

## 2024-08-14 ASSESSMENT — ENCOUNTER SYMPTOMS
VOICE CHANGE: 0
EYE PAIN: 0
RECTAL PAIN: 0
STRIDOR: 0
CHOKING: 0

## 2024-08-14 NOTE — PROGRESS NOTES
FOLLOWUP VISIT    Subjective:    Mr. Preston is a 49 y.o., male,   Chief Complaint   Patient presents with    6 Month Follow-Up       HPI:    The patient presents today for 6-month follow-up.      He remains on an attempted low-cholesterol diet for his hyperlipidemia.      He also has stage IIIa CKD without proteinuria.  He denies any renal symptoms.  Denies any significant NSAID use.  He did not have the renal ultrasound performed due to cost concerns.    The following portions of the patient's history were reviewed and updated as appropriate:      Past Medical History:   Diagnosis Date    Hypercholesterolemia        Past Surgical History:   Procedure Laterality Date    REFRACTIVE SURGERY      WISDOM TOOTH EXTRACTION      WRIST SURGERY Right     for DeQuervain's tenosynovitis       Family History   Problem Relation Age of Onset    High Cholesterol Mother     High Cholesterol Father        Social History     Socioeconomic History    Marital status:      Spouse name: Goldie    Number of children: 3    Years of education: Not on file    Highest education level: Not on file   Occupational History     Comment:    Tobacco Use    Smoking status: Never    Smokeless tobacco: Never   Substance and Sexual Activity    Alcohol use: Not Currently    Drug use: Never    Sexual activity: Not on file   Other Topics Concern    Not on file   Social History Narrative    Not on file     Social Determinants of Health     Financial Resource Strain: Low Risk  (8/12/2024)    Overall Financial Resource Strain (CARDIA)     Difficulty of Paying Living Expenses: Not hard at all   Food Insecurity: No Food Insecurity (8/12/2024)    Hunger Vital Sign     Worried About Running Out of Food in the Last Year: Never true     Ran Out of Food in the Last Year: Never true   Transportation Needs: Unknown (8/12/2024)    PRAPARE - Transportation     Lack of Transportation (Medical): Not on file     Lack of Transportation (Non-Medical): No

## 2024-08-21 ENCOUNTER — PATIENT MESSAGE (OUTPATIENT)
Dept: INTERNAL MEDICINE CLINIC | Facility: CLINIC | Age: 49
End: 2024-08-21

## 2024-08-21 ENCOUNTER — TELEPHONE (OUTPATIENT)
Dept: INTERNAL MEDICINE CLINIC | Facility: CLINIC | Age: 49
End: 2024-08-21

## 2024-08-21 DIAGNOSIS — R16.0 LIVER MASS: Primary | ICD-10-CM

## 2024-08-21 NOTE — TELEPHONE ENCOUNTER
----- Message from Dr. Pieter Leslie MD sent at 8/21/2024  2:25 PM EDT -----  Call patient.  His kidneys appear normal on ultrasound.  He should keep his appointment with nephrology.      He has several liver lesions that are not able to be identified on ultrasound and radiology recommended an MRI which I have ordered.      His prostate is large but his PSA has been normal.  Many (most) males develop an enlarged prostate.

## 2024-08-29 ENCOUNTER — TELEPHONE (OUTPATIENT)
Dept: INTERNAL MEDICINE CLINIC | Facility: CLINIC | Age: 49
End: 2024-08-29

## 2024-08-29 NOTE — TELEPHONE ENCOUNTER
----- Message from Dr. Pieter Leslie MD sent at 8/29/2024  1:53 PM EDT -----  Call patient.  MRI reveals a benign liver hemangioma.  This does not require any treatment or further evaluation.

## 2025-02-12 DIAGNOSIS — Z12.5 PROSTATE CANCER SCREENING: Chronic | ICD-10-CM

## 2025-02-12 DIAGNOSIS — E78.00 HYPERCHOLESTEROLEMIA: ICD-10-CM

## 2025-02-12 DIAGNOSIS — N18.31 CHRONIC KIDNEY DISEASE, STAGE 3A (HCC): ICD-10-CM

## 2025-02-12 LAB
ALBUMIN SERPL-MCNC: 4.2 G/DL (ref 3.5–5)
ALBUMIN/GLOB SERPL: 1.6 (ref 1–1.9)
ALP SERPL-CCNC: 112 U/L (ref 40–129)
ALT SERPL-CCNC: 26 U/L (ref 8–55)
ANION GAP SERPL CALC-SCNC: 11 MMOL/L (ref 7–16)
AST SERPL-CCNC: 25 U/L (ref 15–37)
BASOPHILS # BLD: 0.07 K/UL (ref 0–0.2)
BASOPHILS NFR BLD: 1.3 % (ref 0–2)
BILIRUB SERPL-MCNC: 1.1 MG/DL (ref 0–1.2)
BUN SERPL-MCNC: 18 MG/DL (ref 6–23)
CALCIUM SERPL-MCNC: 9.5 MG/DL (ref 8.8–10.2)
CHLORIDE SERPL-SCNC: 101 MMOL/L (ref 98–107)
CHOLEST SERPL-MCNC: 224 MG/DL (ref 0–200)
CO2 SERPL-SCNC: 27 MMOL/L (ref 20–29)
CREAT SERPL-MCNC: 1.51 MG/DL (ref 0.8–1.3)
DIFFERENTIAL METHOD BLD: ABNORMAL
EOSINOPHIL # BLD: 0.27 K/UL (ref 0–0.8)
EOSINOPHIL NFR BLD: 5 % (ref 0.5–7.8)
ERYTHROCYTE [DISTWIDTH] IN BLOOD BY AUTOMATED COUNT: 12.8 % (ref 11.9–14.6)
GLOBULIN SER CALC-MCNC: 2.7 G/DL (ref 2.3–3.5)
GLUCOSE SERPL-MCNC: 96 MG/DL (ref 70–99)
HCT VFR BLD AUTO: 48.8 % (ref 41.1–50.3)
HDLC SERPL-MCNC: 48 MG/DL (ref 40–60)
HDLC SERPL: 4.7 (ref 0–5)
HGB BLD-MCNC: 15.8 G/DL (ref 13.6–17.2)
IMM GRANULOCYTES # BLD AUTO: 0.01 K/UL (ref 0–0.5)
IMM GRANULOCYTES NFR BLD AUTO: 0.2 % (ref 0–5)
LDLC SERPL CALC-MCNC: 159 MG/DL (ref 0–100)
LYMPHOCYTES # BLD: 1.61 K/UL (ref 0.5–4.6)
LYMPHOCYTES NFR BLD: 29.9 % (ref 13–44)
MCH RBC QN AUTO: 27.5 PG (ref 26.1–32.9)
MCHC RBC AUTO-ENTMCNC: 32.4 G/DL (ref 31.4–35)
MCV RBC AUTO: 84.9 FL (ref 82–102)
MONOCYTES # BLD: 0.52 K/UL (ref 0.1–1.3)
MONOCYTES NFR BLD: 9.7 % (ref 4–12)
NEUTS SEG # BLD: 2.9 K/UL (ref 1.7–8.2)
NEUTS SEG NFR BLD: 53.9 % (ref 43–78)
NRBC # BLD: 0 K/UL (ref 0–0.2)
PLATELET # BLD AUTO: 304 K/UL (ref 150–450)
PMV BLD AUTO: 10.5 FL (ref 9.4–12.3)
POTASSIUM SERPL-SCNC: 4.3 MMOL/L (ref 3.5–5.1)
PROT SERPL-MCNC: 6.8 G/DL (ref 6.3–8.2)
PSA SERPL-MCNC: 2 NG/ML (ref 0–4)
RBC # BLD AUTO: 5.75 M/UL (ref 4.23–5.6)
SODIUM SERPL-SCNC: 139 MMOL/L (ref 136–145)
TRIGL SERPL-MCNC: 89 MG/DL (ref 0–150)
VLDLC SERPL CALC-MCNC: 18 MG/DL (ref 6–23)
WBC # BLD AUTO: 5.4 K/UL (ref 4.3–11.1)

## 2025-02-15 ASSESSMENT — PATIENT HEALTH QUESTIONNAIRE - PHQ9
2. FEELING DOWN, DEPRESSED OR HOPELESS: NOT AT ALL
SUM OF ALL RESPONSES TO PHQ QUESTIONS 1-9: 0
SUM OF ALL RESPONSES TO PHQ9 QUESTIONS 1 & 2: 0
SUM OF ALL RESPONSES TO PHQ QUESTIONS 1-9: 0
SUM OF ALL RESPONSES TO PHQ QUESTIONS 1-9: 0
2. FEELING DOWN, DEPRESSED OR HOPELESS: NOT AT ALL
SUM OF ALL RESPONSES TO PHQ9 QUESTIONS 1 & 2: 0
1. LITTLE INTEREST OR PLEASURE IN DOING THINGS: NOT AT ALL
SUM OF ALL RESPONSES TO PHQ QUESTIONS 1-9: 0
1. LITTLE INTEREST OR PLEASURE IN DOING THINGS: NOT AT ALL

## 2025-02-17 ENCOUNTER — OFFICE VISIT (OUTPATIENT)
Dept: INTERNAL MEDICINE CLINIC | Facility: CLINIC | Age: 50
End: 2025-02-17
Payer: COMMERCIAL

## 2025-02-17 VITALS
HEART RATE: 79 BPM | HEIGHT: 68 IN | BODY MASS INDEX: 27.98 KG/M2 | DIASTOLIC BLOOD PRESSURE: 78 MMHG | WEIGHT: 184.6 LBS | SYSTOLIC BLOOD PRESSURE: 128 MMHG

## 2025-02-17 DIAGNOSIS — Z12.5 PROSTATE CANCER SCREENING: Chronic | ICD-10-CM

## 2025-02-17 DIAGNOSIS — E66.3 OVERWEIGHT (BMI 25.0-29.9): ICD-10-CM

## 2025-02-17 DIAGNOSIS — Z23 ENCOUNTER FOR IMMUNIZATION: ICD-10-CM

## 2025-02-17 DIAGNOSIS — E78.00 HYPERCHOLESTEROLEMIA: ICD-10-CM

## 2025-02-17 DIAGNOSIS — Z12.11 COLON CANCER SCREENING: Chronic | ICD-10-CM

## 2025-02-17 DIAGNOSIS — D18.03 HEPATIC HEMANGIOMA: ICD-10-CM

## 2025-02-17 DIAGNOSIS — Z00.00 ENCOUNTER FOR PREVENTIVE HEALTH EXAMINATION: Primary | Chronic | ICD-10-CM

## 2025-02-17 DIAGNOSIS — N18.31 CHRONIC KIDNEY DISEASE, STAGE 3A (HCC): ICD-10-CM

## 2025-02-17 PROCEDURE — 99396 PREV VISIT EST AGE 40-64: CPT | Performed by: INTERNAL MEDICINE

## 2025-02-17 SDOH — ECONOMIC STABILITY: FOOD INSECURITY: WITHIN THE PAST 12 MONTHS, THE FOOD YOU BOUGHT JUST DIDN'T LAST AND YOU DIDN'T HAVE MONEY TO GET MORE.: NEVER TRUE

## 2025-02-17 SDOH — ECONOMIC STABILITY: FOOD INSECURITY: WITHIN THE PAST 12 MONTHS, YOU WORRIED THAT YOUR FOOD WOULD RUN OUT BEFORE YOU GOT MONEY TO BUY MORE.: NEVER TRUE

## 2025-02-17 ASSESSMENT — ENCOUNTER SYMPTOMS
RECTAL PAIN: 0
VOICE CHANGE: 0
CHOKING: 0
STRIDOR: 0
EYE PAIN: 0

## 2025-02-17 NOTE — PROGRESS NOTES
FOLLOWUP VISIT    Subjective:    Mr. Preston is a 49 y.o., male,   Chief Complaint   Patient presents with    Annual Exam       HPI:    Patient presents today for follow up of two or more chronic medical problems and review of labs.       The patient is also here for the annual wellness visit.     The patient has hyperlipidemia.  The patient has been attempting to follow a low cholesterol diet.    He has had CKD3.  Saw nephrology Dr. Blankenship.  Avoiding NSAIDs and following healthy lifestyle.  24 hour urine collection was reassuring and better than his calculated eGFR on labs.      Interval history  and review of symptoms otherwise unchanged.       The following portions of the patient's history were reviewed and updated as appropriate:      Past Medical History:   Diagnosis Date    Hypercholesterolemia        Past Surgical History:   Procedure Laterality Date    REFRACTIVE SURGERY      WISDOM TOOTH EXTRACTION      WRIST SURGERY Right     for DeQuervain's tenosynovitis       Family History   Problem Relation Age of Onset    High Cholesterol Mother     High Cholesterol Father        Social History     Socioeconomic History    Marital status:      Spouse name: Goldie    Number of children: 3    Years of education: Not on file    Highest education level: Not on file   Occupational History     Comment:    Tobacco Use    Smoking status: Never    Smokeless tobacco: Never   Substance and Sexual Activity    Alcohol use: Not Currently    Drug use: Never    Sexual activity: Not on file   Other Topics Concern    Not on file   Social History Narrative    Not on file     Social Determinants of Health     Financial Resource Strain: Low Risk  (8/12/2024)    Overall Financial Resource Strain (CARDIA)     Difficulty of Paying Living Expenses: Not hard at all   Food Insecurity: No Food Insecurity (2/17/2025)    Hunger Vital Sign     Worried About Running Out of Food in the Last Year: Never true     Ran Out of Food in the

## 2025-08-12 DIAGNOSIS — N18.31 CHRONIC KIDNEY DISEASE, STAGE 3A (HCC): ICD-10-CM

## 2025-08-12 LAB
ANION GAP SERPL CALC-SCNC: 9 MMOL/L (ref 7–16)
BUN SERPL-MCNC: 13 MG/DL (ref 6–23)
CALCIUM SERPL-MCNC: 9.9 MG/DL (ref 8.8–10.2)
CHLORIDE SERPL-SCNC: 101 MMOL/L (ref 98–107)
CO2 SERPL-SCNC: 28 MMOL/L (ref 20–29)
CREAT SERPL-MCNC: 1.42 MG/DL (ref 0.8–1.3)
GLUCOSE SERPL-MCNC: 93 MG/DL (ref 70–99)
POTASSIUM SERPL-SCNC: 4.5 MMOL/L (ref 3.5–5.1)
SODIUM SERPL-SCNC: 139 MMOL/L (ref 136–145)

## 2025-08-18 ENCOUNTER — OFFICE VISIT (OUTPATIENT)
Dept: INTERNAL MEDICINE CLINIC | Facility: CLINIC | Age: 50
End: 2025-08-18
Payer: COMMERCIAL

## 2025-08-18 VITALS
HEIGHT: 68 IN | SYSTOLIC BLOOD PRESSURE: 120 MMHG | BODY MASS INDEX: 27.86 KG/M2 | WEIGHT: 183.8 LBS | OXYGEN SATURATION: 99 % | HEART RATE: 96 BPM | DIASTOLIC BLOOD PRESSURE: 80 MMHG

## 2025-08-18 DIAGNOSIS — Z12.11 COLON CANCER SCREENING: Chronic | ICD-10-CM

## 2025-08-18 DIAGNOSIS — Z12.5 PROSTATE CANCER SCREENING: Chronic | ICD-10-CM

## 2025-08-18 DIAGNOSIS — D18.03 HEPATIC HEMANGIOMA: ICD-10-CM

## 2025-08-18 DIAGNOSIS — Z00.00 ENCOUNTER FOR PREVENTIVE HEALTH EXAMINATION: ICD-10-CM

## 2025-08-18 DIAGNOSIS — N18.31 CHRONIC KIDNEY DISEASE, STAGE 3A (HCC): Primary | ICD-10-CM

## 2025-08-18 DIAGNOSIS — E78.00 HYPERCHOLESTEROLEMIA: ICD-10-CM

## 2025-08-18 PROCEDURE — 99213 OFFICE O/P EST LOW 20 MIN: CPT | Performed by: INTERNAL MEDICINE

## 2025-08-18 ASSESSMENT — ENCOUNTER SYMPTOMS
RECTAL PAIN: 0
CHOKING: 0
STRIDOR: 0
VOICE CHANGE: 0
EYE PAIN: 0